# Patient Record
Sex: MALE | Race: WHITE | Employment: FULL TIME | URBAN - METROPOLITAN AREA
[De-identification: names, ages, dates, MRNs, and addresses within clinical notes are randomized per-mention and may not be internally consistent; named-entity substitution may affect disease eponyms.]

---

## 2021-01-11 ENCOUNTER — HOSPITAL ENCOUNTER (EMERGENCY)
Facility: CLINIC | Age: 41
Discharge: HOME | End: 2021-01-11
Attending: EMERGENCY MEDICINE | Admitting: EMERGENCY MEDICINE
Payer: COMMERCIAL

## 2021-01-11 VITALS
TEMPERATURE: 97.8 F | RESPIRATION RATE: 16 BRPM | SYSTOLIC BLOOD PRESSURE: 136 MMHG | OXYGEN SATURATION: 94 % | HEART RATE: 92 BPM | DIASTOLIC BLOOD PRESSURE: 90 MMHG

## 2021-01-11 DIAGNOSIS — L03.311 CELLULITIS OF ABDOMINAL WALL: ICD-10-CM

## 2021-01-11 LAB
ANION GAP SERPL CALCULATED.3IONS-SCNC: 4 MMOL/L (ref 3–14)
BASOPHILS # BLD AUTO: 0 10E9/L (ref 0–0.2)
BASOPHILS NFR BLD AUTO: 0.3 %
BUN SERPL-MCNC: 17 MG/DL (ref 7–30)
CALCIUM SERPL-MCNC: 9.6 MG/DL (ref 8.5–10.1)
CHLORIDE SERPL-SCNC: 101 MMOL/L (ref 94–109)
CO2 SERPL-SCNC: 30 MMOL/L (ref 20–32)
CREAT SERPL-MCNC: 0.96 MG/DL (ref 0.66–1.25)
DIFFERENTIAL METHOD BLD: NORMAL
EOSINOPHIL # BLD AUTO: 0.3 10E9/L (ref 0–0.7)
EOSINOPHIL NFR BLD AUTO: 4.5 %
ERYTHROCYTE [DISTWIDTH] IN BLOOD BY AUTOMATED COUNT: 14.8 % (ref 10–15)
GFR SERPL CREATININE-BSD FRML MDRD: >90 ML/MIN/{1.73_M2}
GLUCOSE SERPL-MCNC: 154 MG/DL (ref 70–99)
HCT VFR BLD AUTO: 42.3 % (ref 40–53)
HGB BLD-MCNC: 13.7 G/DL (ref 13.3–17.7)
IMM GRANULOCYTES # BLD: 0 10E9/L (ref 0–0.4)
IMM GRANULOCYTES NFR BLD: 0.5 %
LYMPHOCYTES # BLD AUTO: 1.6 10E9/L (ref 0.8–5.3)
LYMPHOCYTES NFR BLD AUTO: 20.3 %
MCH RBC QN AUTO: 26.9 PG (ref 26.5–33)
MCHC RBC AUTO-ENTMCNC: 32.4 G/DL (ref 31.5–36.5)
MCV RBC AUTO: 83 FL (ref 78–100)
MONOCYTES # BLD AUTO: 0.7 10E9/L (ref 0–1.3)
MONOCYTES NFR BLD AUTO: 8.5 %
NEUTROPHILS # BLD AUTO: 5 10E9/L (ref 1.6–8.3)
NEUTROPHILS NFR BLD AUTO: 65.9 %
NRBC # BLD AUTO: 0 10*3/UL
NRBC BLD AUTO-RTO: 0 /100
PLATELET # BLD AUTO: 203 10E9/L (ref 150–450)
POTASSIUM SERPL-SCNC: 3.8 MMOL/L (ref 3.4–5.3)
RBC # BLD AUTO: 5.09 10E12/L (ref 4.4–5.9)
SODIUM SERPL-SCNC: 135 MMOL/L (ref 133–144)
WBC # BLD AUTO: 7.6 10E9/L (ref 4–11)

## 2021-01-11 PROCEDURE — 250N000013 HC RX MED GY IP 250 OP 250 PS 637: Performed by: EMERGENCY MEDICINE

## 2021-01-11 PROCEDURE — 99283 EMERGENCY DEPT VISIT LOW MDM: CPT

## 2021-01-11 PROCEDURE — 85025 COMPLETE CBC W/AUTO DIFF WBC: CPT | Performed by: EMERGENCY MEDICINE

## 2021-01-11 PROCEDURE — 80048 BASIC METABOLIC PNL TOTAL CA: CPT | Performed by: EMERGENCY MEDICINE

## 2021-01-11 RX ORDER — CEPHALEXIN 500 MG/1
500 CAPSULE ORAL ONCE
Status: COMPLETED | OUTPATIENT
Start: 2021-01-11 | End: 2021-01-11

## 2021-01-11 RX ORDER — CEPHALEXIN 500 MG/1
500 CAPSULE ORAL 4 TIMES DAILY
Qty: 28 CAPSULE | Refills: 0 | Status: SHIPPED | OUTPATIENT
Start: 2021-01-11 | End: 2021-01-18

## 2021-01-11 RX ORDER — LIDOCAINE 40 MG/G
CREAM TOPICAL
Status: DISCONTINUED | OUTPATIENT
Start: 2021-01-11 | End: 2021-01-11 | Stop reason: HOSPADM

## 2021-01-11 RX ADMIN — CEPHALEXIN 500 MG: 500 CAPSULE ORAL at 11:41

## 2021-01-11 ASSESSMENT — ENCOUNTER SYMPTOMS
APPETITE CHANGE: 1
FEVER: 0
ABDOMINAL PAIN: 1
ABDOMINAL DISTENTION: 0
CONSTIPATION: 0
COUGH: 1
SLEEP DISTURBANCE: 1
CHILLS: 0
SHORTNESS OF BREATH: 0
WOUND: 1
NAUSEA: 0
HEMATURIA: 0
DIARRHEA: 0
COLOR CHANGE: 1
DYSURIA: 0
VOMITING: 0

## 2021-01-11 NOTE — ED AVS SNAPSHOT
[facility]  [address]  [facility] [address]  Phone: [phone]  Fax: [phone]                                    Nickolas Carroll   MRN: [id number]    Department: [facility]   Date of Visit: 1/11/2021           After Visit Summary Signature Page    I have received my discharge instructions, and my questions have been answered. I have discussed any challenges I see with this plan with the nurse or doctor.    ..........................................................................................................................................  Patient/Patient Representative Signature      ..........................................................................................................................................  Patient Representative Print Name and Relationship to Patient    ..................................................               ................................................  Date                                   Time    ..........................................................................................................................................  Reviewed by Signature/Title    ...................................................              ..............................................  Date                                               Time          22EPIC Rev 08/18

## 2021-01-11 NOTE — ED PROVIDER NOTES
History   Chief Complaint:  Post-op Problem     HPI   Nickolas Carroll is a 40 year old male with a history of hypertension, 5 days s/p umbilical herniorrhpahy with Dr. Andrez Arce at [facility] who presents for evaluation of a post-op problem. The patient reports the onset of redness at 1PM yesterday, inferior to his incision from just above his navel down to his groin. Overnight, around 2:30 AM, he had difficulty sleeping and noticed onset of hives, itching, warmth, and swelling of the area. He has lower abdominal pain with a decreased appetite since his surgery. He talked to a nurse from the surgical service at [facility] who referred him to the ED. He has taken Alleve without relief of symptoms/pain. He has not recently taken any oxycodone from his surgery. He has not taken Benadryl and states that he is allergic to ibuprofen. He denies fever, chills, shortness of breath, nausea or vomiting. He denies dysuria, hematuria, constipation or diarrhea. He denies loss of taste or smell. He has a mild cough but attributes it to recently quitting smoking 3 weeks ago    Review of Systems   Constitutional: Positive for appetite change. Negative for chills and fever.   HENT:        No loss of taste/smell   Respiratory: Positive for cough. Negative for shortness of breath.    Gastrointestinal: Positive for abdominal pain. Negative for abdominal distention, constipation, diarrhea, nausea and vomiting.   Genitourinary: Negative for dysuria and hematuria.   Skin: Positive for color change, rash and wound (surgical incision).   Psychiatric/Behavioral: Positive for sleep disturbance.   All other systems reviewed and are negative.      Allergies:  Ibuprofen    Medications:  Norvasc  Klonopin  Zoloft  Oxycodone  Senna     Past Medical History:    Hypertension  Umbilical hernia   Anxiety   Depression    Past Surgical History:    Umbilical herniorrhaphy   Pyloric stenosis  Ankle surgery    Family History:     Hypertension - father, mother  Neurofibromatosis - father     Social History:  The patient was unaccompanied to the ED.  PCP: Max Schneider MD  Marital status:   Smoking Status: Quit 3 weeks ago  Smokeless Tobacco: Never Used  Alcohol Use: Positive      Physical Exam     Patient Vitals for the past 24 hrs:   BP Temp Temp src Pulse Resp SpO2   01/11/21 1143 -- -- -- -- 16 --   01/11/21 1130 (!) 136/90 -- -- 92 -- 94 %   01/11/21 1115 (!) 136/91 -- -- 92 -- 96 %   01/11/21 1100 (!) 143/100 -- -- 100 -- 95 %   01/11/21 1045 (!) 152/102 -- -- 99 -- 94 %   01/11/21 1030 (!) 152/91 -- -- 96 -- 93 %   01/11/21 1015 (!) 152/100 -- -- 99 -- 93 %   01/11/21 1000 (!) 159/142 -- -- 100 -- 95 %   01/11/21 0945 (!) 156/107 -- -- 96 -- 95 %   01/11/21 0930 (!) 149/117 -- -- 105 -- 96 %   01/11/21 0920 (!) 166/112 -- -- 112 -- 97 %   01/11/21 0916 (!) 145/110 97.8  F (36.6  C) Oral 114 18 95 %       Physical Exam  Nursing note and vitals reviewed.  HENT:    Mouth/Throat: Oral mucosa moist.    Eyes: Conjunctivae are normal. No scleral icterus.  Neck: Neck supple. No cervical adenopathy  Cardiovascular: Borderline tachycardic rate, regular rhythm and intact distal pulses.    Pulmonary/Chest: Effort normal and breath sounds normal.   Abdominal: Soft.  No distension. There is no tenderness. Skin exam as noted below. Incision intact. No drainage.   Musculoskeletal:  No edema, No calf tenderness  Neurological:Alert and answering questions appropriately. Coordination normal.   Skin: Skin is warm and dry. Erythema as noted below.   Psychiatric: Normal mood and affect.           Emergency Department Course     Laboratory:  CBC: WBC: 7.6, HGB: 13.7, PLT: 203  BMP: Glucose 154 (H0, o/w WNL (Creatinine: 0.96)    Emergency Department Course:    Reviewed:  I reviewed the patient's nursing notes, vitals and past medical history.     Assessments:  0925 I performed an exam of the patient in room ED20 as documented above.  1114 Patient  rechecked and updated.      Consults:    1127 I spoke with Dr. Andrez Arce of the general surgery service regarding patient's presentation, findings, and plan of care.    Interventions:  1141 Keflex, 500 mg, Oral    Disposition:  The patient was discharged to home.     Impression & Plan   Medical Decision Making:  Nickolas Carroll is a 40 year old male who presents to the emergency department today for evaluation of wound after umbilical herniorrhaphy. Exam consistent with cellulitis infection vs. Inflammatory. Well appearing. Normal WBC. Consulted with operating surgeon who requested initiate keflex and will see in clinic this afternoon. The patient feels comfortable with this plan. Erythema outlined and dated for objectivity at follow up. No progression during time in the ED. With reasonable clinical certainty I feel that the patient is safe for discharge home for ongoing evaluation and management as an outpatient.         Diagnosis:    ICD-10-CM    1. Cellulitis of abdominal wall  L03.311     infectious vs. inflammatory       Discharge Medication List as of 1/11/2021 11:37 AM      START taking these medications    Details   cephALEXin (KEFLEX) 500 MG capsule Take 1 capsule (500 mg) by mouth 4 times daily for 7 days, Disp-28 capsule, R-0, E-Prescribe             Scribe Disclosure:  I, Silke Pierce, am serving as a scribe at 9:26 AM on 1/11/2021 to document services personally performed by Patti Srinivasan MD based on my observations and the provider's statements to me.     This note was completed in part using Dragon voice recognition software. Although reviewed after completion, some word and grammatical errors may occur.      Patti Srinivasan MD  01/12/21 [id number]

## 2021-01-11 NOTE — DISCHARGE INSTRUCTIONS
Start antibiotics as prescribed.   Present to the [facility] if you develop new/concerning symptoms that can not wait for follow up appointment this afternoon.

## 2021-01-11 NOTE — ED TRIAGE NOTES
Patient presents to the ED reporting abdominal redness, pain and swelling following an umbilical hernia repair on Thursday. States redness began yesterday and today noted a rash.